# Patient Record
Sex: MALE
[De-identification: names, ages, dates, MRNs, and addresses within clinical notes are randomized per-mention and may not be internally consistent; named-entity substitution may affect disease eponyms.]

---

## 2023-08-03 ENCOUNTER — APPOINTMENT (OUTPATIENT)
Dept: UROLOGY | Facility: CLINIC | Age: 33
End: 2023-08-03

## 2024-03-10 ENCOUNTER — NON-APPOINTMENT (OUTPATIENT)
Age: 34
End: 2024-03-10

## 2024-03-22 PROBLEM — Z00.00 ENCOUNTER FOR PREVENTIVE HEALTH EXAMINATION: Status: ACTIVE | Noted: 2024-03-22

## 2024-03-25 NOTE — REASON FOR VISIT
[FreeTextEntry1] :  CHIEF COMPLAINT: Here to establish cardiac care. Went to [facility] at [address] [facility] Tele: [phone] on 3/11/24 for lightheadedness associated with blurry vision, muffled hearing, cold sweats for 2 weeks and was referred to see cardiology.   Symptoms now?  Were you sick then with viral infection?  Seen previous cardiologist in 2018 and reports negative stress test and EKG. Neurology did EEG which pt reports as normal ?   PCP?  PCP prescribed meclizine but did not take?  ENT? Neurologist?   CP, Palpitations, Lightheadedness, Dizziness, Leg Edema ?     HISTORY OF PRESENT ILLNESS:        DIAGNOSTIC TESTS:  -----------------------------------        FAMILY Hx:  Mother:  Father:        SURGICAL Hx: None  SOCIAL Hx: Never smoked | Drinking:  Allergies: NKDA     Rx: Albuterol Sulfate HFA Inhaler PRN      PMHx:     EXAM:  Ht:  Wt: lbs BMI   BP= LUE RUE HR=  bpm   HEENT; carotid bruits, -JVD, - lymphadenopathy, - thyroidomegaly CVS: RRR, S1, S2, No murmur, no rubs or gallops LUNGS: Clear to auscultation ABDOMEN : Soft/benign, + BS, no pbbyhi-[id number]-[id number], EXTREMITY: No edema, no skin changes, ulcers or discoloration PULSES; 1+dp/pt    ASSESSMENT/PLAN:  1.  2.  3.  4.  5.  SCHEDULE FOLLOW UP FOR: In  I spent ? minutes with the patient face to face, greater than 50% of the encounter was spent counseling/coordinating care for essential, microvascular cerebral disease, leg pain/cramping, poor balance.

## 2024-03-28 ENCOUNTER — APPOINTMENT (OUTPATIENT)
Dept: OTOLARYNGOLOGY | Facility: CLINIC | Age: 34
End: 2024-03-28
Payer: COMMERCIAL

## 2024-03-28 VITALS
OXYGEN SATURATION: 97 % | WEIGHT: 136 LBS | HEART RATE: 100 BPM | SYSTOLIC BLOOD PRESSURE: 114 MMHG | TEMPERATURE: 97.4 F | BODY MASS INDEX: 24.1 KG/M2 | HEIGHT: 63 IN | DIASTOLIC BLOOD PRESSURE: 78 MMHG

## 2024-03-28 DIAGNOSIS — H93.12 TINNITUS, LEFT EAR: ICD-10-CM

## 2024-03-28 DIAGNOSIS — Z82.49 FAMILY HISTORY OF ISCHEMIC HEART DISEASE AND OTHER DISEASES OF THE CIRCULATORY SYSTEM: ICD-10-CM

## 2024-03-28 DIAGNOSIS — Z78.9 OTHER SPECIFIED HEALTH STATUS: ICD-10-CM

## 2024-03-28 DIAGNOSIS — Z83.3 FAMILY HISTORY OF DIABETES MELLITUS: ICD-10-CM

## 2024-03-28 DIAGNOSIS — Z80.9 FAMILY HISTORY OF MALIGNANT NEOPLASM, UNSPECIFIED: ICD-10-CM

## 2024-03-28 DIAGNOSIS — Z87.09 PERSONAL HISTORY OF OTHER DISEASES OF THE RESPIRATORY SYSTEM: ICD-10-CM

## 2024-03-28 PROCEDURE — 99203 OFFICE O/P NEW LOW 30 MIN: CPT

## 2024-03-28 NOTE — HISTORY OF PRESENT ILLNESS
[de-identified] : 34 y/o M had episode of dizziness in 12/23. At the time he felt pressure in his chest and vision "shifted" and he got cold sweats, no room spinning. He sat down immediately and did not lose consciousness. The following week he had the flu which has since resolved. Ever since this event he has been getting dizzy every day and has a sensitivity to lights. He has instances where he feels his "blood pressure is tanking" and he has "tunnel vision." He has also has left tinnitus. He has an appointment with neuro in April and an apt with cardio in May. He recently went to urgent care and had an EKG and blood pressure were normal. He has h/o "fainting spells" approximately once every 3-4 months 5 years ago. He saw a cardiologist/ neurologist at the time and workups were negative and it resolved. He has h/o left sudden hearing loss approximately 10 years ago which did not recover. He was not treated for this. He denies additional life stressors. He gets dizzy when he sits up/ gets up quickly. No FH pertinent to cc. Nonsmoker.

## 2024-03-28 NOTE — PHYSICAL EXAM
[Midline] : trachea located in midline position [Normal] : no rashes [] : Angora-Hallpike test is negative [de-identified] : gait steady

## 2024-03-28 NOTE — ASSESSMENT
[FreeTextEntry1] : vertigo, photophobia, h/o fainting spells, l tinnitus, h/o l asymmetric snhl - -MRI -VNG -ecog -followup with neurologist and cardiologist- he is scheduled for appts in early April and May -recommended he go to ED/ urgent care today for fainting sensation and to r/o cardiac causes RTC with , MRI, VNG, ecog to review findings

## 2024-04-03 ENCOUNTER — APPOINTMENT (OUTPATIENT)
Dept: HEART AND VASCULAR | Facility: CLINIC | Age: 34
End: 2024-04-03
Payer: COMMERCIAL

## 2024-04-03 VITALS
DIASTOLIC BLOOD PRESSURE: 70 MMHG | WEIGHT: 136 LBS | HEART RATE: 118 BPM | TEMPERATURE: 97.6 F | SYSTOLIC BLOOD PRESSURE: 122 MMHG | OXYGEN SATURATION: 98 % | HEIGHT: 63 IN | BODY MASS INDEX: 24.1 KG/M2

## 2024-04-03 DIAGNOSIS — Z78.9 OTHER SPECIFIED HEALTH STATUS: ICD-10-CM

## 2024-04-03 DIAGNOSIS — J30.2 OTHER SEASONAL ALLERGIC RHINITIS: ICD-10-CM

## 2024-04-03 DIAGNOSIS — Z87.898 PERSONAL HISTORY OF OTHER SPECIFIED CONDITIONS: ICD-10-CM

## 2024-04-03 PROCEDURE — 93000 ELECTROCARDIOGRAM COMPLETE: CPT

## 2024-04-03 PROCEDURE — 99204 OFFICE O/P NEW MOD 45 MIN: CPT | Mod: 25

## 2024-04-03 NOTE — DISCUSSION/SUMMARY
[FreeTextEntry1] : EKG today: /min  1. Syncope/presyncope - suspect vasovagal - plan for event monitor, TTE, EST Might need tilt table 2. Lifestyle measures d/w pt 3. HLD - diet only 4. RTC 1 month 
Leaving Home is Contraindicated...

## 2024-04-03 NOTE — REASON FOR VISIT
[FreeTextEntry1] : Pt. is a 33-year-old M with PMHx of vertigo and syncope here to establish cardiac care. Patient

## 2024-04-03 NOTE — PHYSICAL EXAM
[Well Nourished] : well nourished [Well Developed] : well developed [Normal Conjunctiva] : normal conjunctiva [Normal Venous Pressure] : normal venous pressure [No Carotid Bruit] : no carotid bruit [Normal S1, S2] : normal S1, S2 [No Murmur] : no murmur [Clear Lung Fields] : clear lung fields [Good Air Entry] : good air entry [Soft] : abdomen soft [Non Tender] : non-tender [Normal Gait] : normal gait [No Edema] : no edema [No Rash] : no rash [Moves all extremities] : moves all extremities [Alert and Oriented] : alert and oriented

## 2024-04-03 NOTE — ASSESSMENT
[FreeTextEntry1] : Pt. is a 33-year-old M with PMHx of vertigo and syncope here to establish cardiac care.

## 2024-04-03 NOTE — REVIEW OF SYSTEMS
[Fever] : no fever [Blurry Vision] : no blurred vision [SOB] : no shortness of breath [Dyspnea on exertion] : not dyspnea during exertion [Chest Discomfort] : no chest discomfort [Syncope] : syncope [Cough] : no cough [Abdominal Pain] : no abdominal pain [Urinary Frequency] : no change in urinary frequency [Joint Pain] : no joint pain [Rash] : no rash [Dizziness] : no dizziness [Confusion] : no confusion was observed [Easy Bleeding] : no tendency for easy bleeding

## 2024-04-03 NOTE — HISTORY OF PRESENT ILLNESS
[FreeTextEntry1] : PCP: Recent labs:  Prior cardiac testing:  Pt. went to [facility] at [address] [facility] (Tele: [phone]) on 3/11/24 for lightheadedness associated with blurry vision, muffled hearing, cold sweats for 2 weeks and was referred to Cardiology.  Activity:   Pt. denies any chest pain, dyspnea, orthopnea, PND, palpitations, lightheadedness, syncope or lower extremity edema.   ================================= Labs/Diagnostics:

## 2024-04-04 ENCOUNTER — NON-APPOINTMENT (OUTPATIENT)
Age: 34
End: 2024-04-04

## 2024-04-10 ENCOUNTER — APPOINTMENT (OUTPATIENT)
Dept: NEUROLOGY | Facility: CLINIC | Age: 34
End: 2024-04-10
Payer: COMMERCIAL

## 2024-04-10 ENCOUNTER — NON-APPOINTMENT (OUTPATIENT)
Age: 34
End: 2024-04-10

## 2024-04-10 VITALS
BODY MASS INDEX: 23.92 KG/M2 | HEART RATE: 69 BPM | TEMPERATURE: 97.5 F | DIASTOLIC BLOOD PRESSURE: 75 MMHG | SYSTOLIC BLOOD PRESSURE: 114 MMHG | HEIGHT: 63 IN | WEIGHT: 135 LBS | OXYGEN SATURATION: 99 %

## 2024-04-10 DIAGNOSIS — H53.149 VISUAL DISCOMFORT, UNSPECIFIED: ICD-10-CM

## 2024-04-10 PROCEDURE — 99204 OFFICE O/P NEW MOD 45 MIN: CPT

## 2024-04-10 RX ORDER — FLUTICASONE PROPIONATE 50 UG/1
SPRAY, METERED NASAL
Refills: 0 | Status: ACTIVE | COMMUNITY

## 2024-04-10 RX ORDER — CETIRIZINE HCL 10 MG
TABLET ORAL DAILY
Refills: 0 | Status: ACTIVE | COMMUNITY

## 2024-04-10 NOTE — DISCUSSION/SUMMARY
[FreeTextEntry1] : Impression: 1) Near-syncope - weekly episodes since December, 2023. Similar to events in the past - aEEG/cardiac work up normal at that time 2) Headaches - R parietal pain associated with photophobia and nausea 3) Dizziness - possibly related to pre-syncope vs atypical migraine  Plan: 1) a/rEEG 2) MRI brain  3) Continue cardiac work up 4) Follow up once testing is complete. If everything is normal, consider starting treatment for migraine

## 2024-04-10 NOTE — REVIEW OF SYSTEMS
[Fever] : no fever [Chills] : no chills [As Noted in HPI] : as noted in HPI [As noted in HPI] : as noted in HPI [Shortness Of Breath] : no shortness of breath

## 2024-04-10 NOTE — HISTORY OF PRESENT ILLNESS
[FreeTextEntry1] : Johnson is a 33 year old male presenting with dizziness, headaches, and near syncope.   In 2018, started experiencing fainting spells. Occurred every few months. Suddenly felt lightheaded and would collapse. Cardiology and neurology workup normal. aEEG normal. Episodes resolved within the year.   December 2023, was walking dog when he suddenly felt pressure in his chest - similar to syncopal episodes in the past. Felt lightheaded, ringing in his ear. Tunnel vision like he may pass out. Able to sit down, feeling passed in 10 minutes. Since then, near syncopal episodes happening weekly. Comes on suddenly. Can be talking to someone when he feels like his body is shutting down. Difficulty speaking. Sits down, takes deep breaths and feeling passes within 10 minutes. If he does not sit down, confident that he would lose consciousness.  Also developed frequent dizziness. Not like the room is spinning but a feeling that he is wearing glasses that are too strong for him. Usually triggered when he is outside of the house. Lasts for a few minutes at a time up to several times per day.   Sleep is good. Wakes up several times throughout the night to use the restroom but able to fall back to sleep quickly. Feels well rested in the morning, but notes he feels exhausted when near syncopal episodes occur.   Headaches more noticeable since symptoms started last year.  Headache History: Onset and course: Worse over the past 4 months  Location: R parietal  Character: Dull, achy Severity: Duration: Several hours  Hypersensitivity: photophobia, nausea  H/A days/month: Several days per week  Aura: None  Autonomic symptoms: Dizziness  Alleviated by: Aggravations/Triggers: Advil/Tylenol  Sleep/menses:  Other medication/supplement trials: Fam Hx of H/A: Imaging: MRI in 2018   Following with cardiology. Wore Holter monitor for several days, no events recorded while wearing. Echo and stress test next month.   Also following with ENT, scheduled for MRI brain & IAC w/wo next week.

## 2024-04-10 NOTE — PHYSICAL EXAM
[FreeTextEntry1] : General: Constitutional: Sitting comfortably in NAD Psychiatric: well-groomed, appropriate affect, insight/judgement intact Ears, Nose, Throat: no abnormalities, mucus membranes moist Extremities: no edema, clubbing, or cyanosis Skin: no rash or neurocutaneous signs  Cognitive: Orientation, language, memory and knowledge screens intact Cranial Nerves: II: Full to confrontation; disc margins sharp. III/IV/VI: PERRL EOMI, no nystagmus [id number]: Symmetric. VII: Face appears symmetric. VIII: Normal to screening, IX/X: Palate elevates symmetrical. XI: Trapezius symmetric. XII: Tongue midline  Motor: Power: 5/5 throughout Tone: Normal x4 limbs Tremor: none  Sensation: intact to light touch  Coordination/Gait: Finger-nose-finger intact, normal rapid-alternating movements Fine motor normal with normal rapid finger taps and heel tapping Romberg negative  Reflexes: DTR: 2+ symmetric x4 limbs

## 2024-04-15 ENCOUNTER — APPOINTMENT (OUTPATIENT)
Dept: OTOLARYNGOLOGY | Facility: CLINIC | Age: 34
End: 2024-04-15
Payer: COMMERCIAL

## 2024-04-15 VITALS
DIASTOLIC BLOOD PRESSURE: 76 MMHG | SYSTOLIC BLOOD PRESSURE: 113 MMHG | WEIGHT: 135 LBS | TEMPERATURE: 98 F | HEIGHT: 63 IN | OXYGEN SATURATION: 98 % | HEART RATE: 81 BPM | BODY MASS INDEX: 23.92 KG/M2

## 2024-04-15 DIAGNOSIS — R42 DIZZINESS AND GIDDINESS: ICD-10-CM

## 2024-04-15 PROCEDURE — 92537 CALORIC VSTBLR TEST W/REC: CPT

## 2024-04-15 PROCEDURE — 99213 OFFICE O/P EST LOW 20 MIN: CPT

## 2024-04-15 PROCEDURE — 92557 COMPREHENSIVE HEARING TEST: CPT

## 2024-04-15 PROCEDURE — 92584 ELECTROCOCHLEOGRAPHY: CPT

## 2024-04-15 PROCEDURE — 92550 TYMPANOMETRY & REFLEX THRESH: CPT | Mod: 52

## 2024-04-15 PROCEDURE — 92540 BASIC VESTIBULAR EVALUATION: CPT

## 2024-04-16 NOTE — DATA REVIEWED
[de-identified] :  l mild chl tymps nl reviewed with pt; ecog nl reviewed with pt; vng nl reviewed with pt [de-identified] : mri tmj sinus retention cysts and npx retention cyst

## 2024-04-16 NOTE — ASSESSMENT
[FreeTextEntry1] : dizziness explained  has mild l chl; vng and ecog nl; mri cannot explain dizziness reviewed and reassured soft diet see dentist for tmj rec vemp ordered (l chl), fistula test rtc with above

## 2024-04-16 NOTE — HISTORY OF PRESENT ILLNESS
[de-identified] : 18 day follow up visit for this 34 y/o M with an episode of dizziness in . At the time he felt pressure in his chest and vision "shifted" and he got cold sweats, no room spinning. He sat down immediately and did not lose consciousness. Ever since this event he has been getting dizzy every day and has a sensitivity to lights. He has instances where he feels his "blood pressure is tanking" and he has "tunnel vision." He has h/o "fainting spells" approximately once every 3-4 months 5 years ago. He saw a cardiologist/ neurologist at the time and workups were negative and it resolved. He has h/o left sudden hearing loss approximately 10 years ago which did not recover. He was not treated for this. He is here to review , MRI, VNG, and ecog.

## 2024-04-16 NOTE — PHYSICAL EXAM
[de-identified] : b [Normal] : tympanic membranes are normal in both ears [de-identified] : gait steady

## 2024-04-25 ENCOUNTER — APPOINTMENT (OUTPATIENT)
Dept: OTOLARYNGOLOGY | Facility: CLINIC | Age: 34
End: 2024-04-25

## 2024-05-07 ENCOUNTER — APPOINTMENT (OUTPATIENT)
Dept: HEART AND VASCULAR | Facility: CLINIC | Age: 34
End: 2024-05-07

## 2024-05-08 ENCOUNTER — APPOINTMENT (OUTPATIENT)
Dept: NEUROLOGY | Facility: CLINIC | Age: 34
End: 2024-05-08
Payer: COMMERCIAL

## 2024-05-08 PROCEDURE — 95819 EEG AWAKE AND ASLEEP: CPT

## 2024-05-09 ENCOUNTER — APPOINTMENT (OUTPATIENT)
Dept: NEUROLOGY | Facility: CLINIC | Age: 34
End: 2024-05-09

## 2024-05-09 PROCEDURE — 95708 EEG WO VID EA 12-26HR UNMNTR: CPT

## 2024-05-09 PROCEDURE — 95719 EEG PHYS/QHP EA INCR W/O VID: CPT

## 2024-05-09 PROCEDURE — 95700 EEG CONT REC W/VID EEG TECH: CPT

## 2024-05-15 ENCOUNTER — APPOINTMENT (OUTPATIENT)
Dept: HEART AND VASCULAR | Facility: CLINIC | Age: 34
End: 2024-05-15
Payer: COMMERCIAL

## 2024-05-15 VITALS
BODY MASS INDEX: 23.92 KG/M2 | SYSTOLIC BLOOD PRESSURE: 105 MMHG | HEIGHT: 63 IN | WEIGHT: 135 LBS | HEART RATE: 100 BPM | DIASTOLIC BLOOD PRESSURE: 72 MMHG

## 2024-05-15 DIAGNOSIS — I25.10 ATHEROSCLEROTIC HEART DISEASE OF NATIVE CORONARY ARTERY W/OUT ANGINA PECTORIS: ICD-10-CM

## 2024-05-15 DIAGNOSIS — R56.9 UNSPECIFIED CONVULSIONS: ICD-10-CM

## 2024-05-15 DIAGNOSIS — I48.92 UNSPECIFIED ATRIAL FLUTTER: ICD-10-CM

## 2024-05-15 PROCEDURE — 93306 TTE W/DOPPLER COMPLETE: CPT

## 2024-05-15 PROCEDURE — 99214 OFFICE O/P EST MOD 30 MIN: CPT | Mod: 25

## 2024-05-15 PROCEDURE — 93000 ELECTROCARDIOGRAM COMPLETE: CPT | Mod: 59

## 2024-05-15 PROCEDURE — 93015 CV STRESS TEST SUPVJ I&R: CPT

## 2024-05-15 RX ORDER — ROSUVASTATIN CALCIUM 40 MG/1
40 TABLET, FILM COATED ORAL
Qty: 90 | Refills: 3 | Status: ACTIVE | COMMUNITY
Start: 2024-05-15 | End: 1900-01-01

## 2024-05-15 NOTE — DISCUSSION/SUMMARY
[FreeTextEntry1] : EKG today: /min Event monitor: NSR , avg 92/min, low SVPC, VPC burden ECHO: LV EF normal, trace TR EST: Normal maximal exercise stress test  1. Syncope/presyncope - suspect vasovagal - plan tilt table 2. Lifestyle measures d/w pt -hydration, salt, avoiding prolonged standing 3. HLD - diet only 4. RTC 1 month

## 2024-05-15 NOTE — HISTORY OF PRESENT ILLNESS
[FreeTextEntry1] : Patient comes for f/u re evaluation of syncope and dizziness. No event during event monitor, but LOC while in movie theater. c/o feeling weak during exercise (running, swimming, lifting weight)

## 2024-05-15 NOTE — REASON FOR VISIT
[Home] : at home, [unfilled] , at the time of the visit. [Medical Office: ([facility])___] : at the medical office located in  [Patient] : the patient [Follow-Up: _____] : a [unfilled] follow-up visit

## 2024-05-15 NOTE — REVIEW OF SYSTEMS
[Syncope] : syncope [Fever] : no fever [Blurry Vision] : no blurred vision [SOB] : no shortness of breath [Dyspnea on exertion] : not dyspnea during exertion [Chest Discomfort] : no chest discomfort [Cough] : no cough [Abdominal Pain] : no abdominal pain [Urinary Frequency] : no change in urinary frequency [Joint Pain] : no joint pain [Rash] : no rash [Dizziness] : no dizziness [Confusion] : no confusion was observed [Easy Bleeding] : no tendency for easy bleeding

## 2024-05-16 ENCOUNTER — APPOINTMENT (OUTPATIENT)
Dept: NEUROLOGY | Facility: CLINIC | Age: 34
End: 2024-05-16
Payer: COMMERCIAL

## 2024-05-16 DIAGNOSIS — R55 SYNCOPE AND COLLAPSE: ICD-10-CM

## 2024-05-16 DIAGNOSIS — Z87.898 PERSONAL HISTORY OF OTHER SPECIFIED CONDITIONS: ICD-10-CM

## 2024-05-16 PROCEDURE — 99214 OFFICE O/P EST MOD 30 MIN: CPT

## 2024-05-16 NOTE — HISTORY OF PRESENT ILLNESS
Pt arrived to room L220 at 1900 via wheelchair w/ infant in arms. Appeared in stable condition. Pt oriented to room and unit.    [FreeTextEntry1] : 5/16/24 HPI: Johnson is a 33 year old male presenting for a follow up visit for dizziness, headaches, and near syncope.   No episodes while connected to aEEG, but experienced small episode the day after disconnecting. Near syncope usually occurs in crowded spaces, such as on the subway or in a theater. Dizzy feeling like the room is spinning every few days, not necessarily related to near-syncope.   Stress test, Echo both normal. Cardiologist suspects this may be vasovagal syncope but would like tilt test to officially diagnose.  --------------------------- Last seen 4/10/24: In 2018, started experiencing fainting spells. Occurred every few months. Suddenly felt lightheaded and would collapse. Cardiology and neurology workup normal. aEEG normal. Episodes resolved within the year.  December 2023, was walking dog when he suddenly felt pressure in his chest - similar to syncopal episodes in the past. Felt lightheaded, ringing in his ear. Tunnel vision like he may pass out. Able to sit down, feeling passed in 10 minutes. Since then, near syncopal episodes happening weekly. Comes on suddenly. Can be talking to someone when he feels like his body is shutting down. Difficulty speaking. Sits down, takes deep breaths and feeling passes within 10 minutes. If he does not sit down, confident that he would lose consciousness.  Also developed frequent dizziness. Not like the room is spinning but a feeling that he is wearing glasses that are too strong for him. Usually triggered when he is outside of the house. Lasts for a few minutes at a time up to several times per day.  Sleep is good. Wakes up several times throughout the night to use the restroom but able to fall back to sleep quickly. Feels well rested in the morning, but notes he feels exhausted when near syncopal episodes occur.  Headaches more noticeable since symptoms started last year. Headache History: Onset and course: Worse over the past 4 months Location: R parietal Character: Dull, achy Severity: Duration: Several hours Hypersensitivity: photophobia, nausea H/A days/month: Several days per week Aura: None Autonomic symptoms: Dizziness Alleviated by: Aggravations/Triggers: Advil/Tylenol Sleep/menses: Other medication/supplement trials: Fam Hx of H/A: Imaging: MRI in 2018  Following with cardiology. Wore Holter monitor for several days, no events recorded while wearing. Echo and stress test next month.  Also following with ENT, scheduled for MRI brain & IAC w/wo next week.

## 2024-05-16 NOTE — DISCUSSION/SUMMARY
[FreeTextEntry1] : Impression: 1) Near-syncope - weekly episodes since December, 2023. Similar to events in the past - aEEG/cardiac work up normal at that time. Recent a/rEEG normal, but no events while connected  2) Headaches - R parietal pain associated with photophobia and nausea. MRI normal  3) Dizziness - possibly related to pre-syncope vs atypical migraine  Plan: 1) Continue with cardiology work up and tilt-table test. If negative, can consider trial of Verapamil for possible dysautonomia  2) If events increase in frequency, can consider another aEEG to try to capture event

## 2024-05-16 NOTE — PHYSICAL EXAM
[FreeTextEntry1] : General: Constitutional: Sitting comfortably in NAD. Psychiatric: well-groomed, appropriate affect  Cognitive: Orientation, language, memory and knowledge screens intact.  Cranial Nerves: II: ELVIS. III/IV/VI: EOM Full. VII: Face appears symmetric. VIII: Normal to screening. IX/X: Normal phonation. XI: Trapezius Symmetric. XII: Tongue midline.

## 2024-07-02 ENCOUNTER — NON-APPOINTMENT (OUTPATIENT)
Age: 34
End: 2024-07-02

## 2024-07-03 ENCOUNTER — APPOINTMENT (OUTPATIENT)
Dept: HEART AND VASCULAR | Facility: CLINIC | Age: 34
End: 2024-07-03
Payer: COMMERCIAL

## 2024-07-03 VITALS
OXYGEN SATURATION: 97 % | HEART RATE: 100 BPM | DIASTOLIC BLOOD PRESSURE: 67 MMHG | BODY MASS INDEX: 23.92 KG/M2 | TEMPERATURE: 98 F | SYSTOLIC BLOOD PRESSURE: 104 MMHG | WEIGHT: 135 LBS | HEIGHT: 63 IN

## 2024-07-03 DIAGNOSIS — I48.92 UNSPECIFIED ATRIAL FLUTTER: ICD-10-CM

## 2024-07-03 DIAGNOSIS — R55 SYNCOPE AND COLLAPSE: ICD-10-CM

## 2024-07-03 DIAGNOSIS — I25.10 ATHEROSCLEROTIC HEART DISEASE OF NATIVE CORONARY ARTERY W/OUT ANGINA PECTORIS: ICD-10-CM

## 2024-07-03 DIAGNOSIS — Z87.898 PERSONAL HISTORY OF OTHER SPECIFIED CONDITIONS: ICD-10-CM

## 2024-07-03 PROCEDURE — 99214 OFFICE O/P EST MOD 30 MIN: CPT | Mod: 25

## 2024-07-03 PROCEDURE — 93000 ELECTROCARDIOGRAM COMPLETE: CPT

## 2024-07-03 RX ORDER — DILTIAZEM HYDROCHLORIDE 120 MG/1
120 CAPSULE, EXTENDED RELEASE ORAL DAILY
Qty: 90 | Refills: 3 | Status: ACTIVE | COMMUNITY
Start: 2024-07-03 | End: 1900-01-01

## 2024-08-17 ENCOUNTER — TRANSCRIPTION ENCOUNTER (OUTPATIENT)
Age: 34
End: 2024-08-17

## 2024-08-22 ENCOUNTER — APPOINTMENT (OUTPATIENT)
Dept: GASTROENTEROLOGY | Facility: CLINIC | Age: 34
End: 2024-08-22